# Patient Record
Sex: MALE | Race: WHITE
[De-identification: names, ages, dates, MRNs, and addresses within clinical notes are randomized per-mention and may not be internally consistent; named-entity substitution may affect disease eponyms.]

---

## 2019-01-19 ENCOUNTER — HOSPITAL ENCOUNTER (EMERGENCY)
Dept: HOSPITAL 80 - FED | Age: 19
Discharge: HOME | End: 2019-01-19
Payer: COMMERCIAL

## 2019-01-19 VITALS — SYSTOLIC BLOOD PRESSURE: 103 MMHG | DIASTOLIC BLOOD PRESSURE: 63 MMHG

## 2019-01-19 DIAGNOSIS — F10.920: Primary | ICD-10-CM

## 2019-01-19 NOTE — EDPHY
H & P


Time Seen by Provider: 01/19/19 03:48


HPI/ROS: 





Chief Complaint:  Alcohol intoxication, vomiting





HPI:  17-year-old male who was [facility] in [facility] [employer] [facility] intoxicated.  

Patient passed out after vomiting. Is unable to ambulate on their own.  Patient 

brought in by EMS for further evaluation.  No obvious signs of trauma per EMS.  

Patient admits to drinking 2 shots of vodka and beer.  Denies falling down or 

hitting his head.  He did vomited any ambulance.  Was given Zofran and IV 

fluids.





ROS:  10 systems were reviewed and were negative except those elements noted in 

the HPI.





PMH:  Denies


Medications:  Denies





Social History:  Positive for alcohol





Family History: non-contributory





Physical Exam:


Gen:  Awake, slurred speech, maintaining airway, smells of alcohol and emesis


HEENT:  Atraumatic


     Nose: no epistaxis or deformity


     Eyes: PERRLA, EOMI


     Mouth: Moist mucosa 


Neck: Supple, no step-offs or deformity


Chest:  Atraumatic, lungs clear to auscultation


Heart: S1, S2 normal, no murmur


Abd: Soft, non-tender, no guarding


Back:  Atraumatic


Ext: no edema, atraumatic


Skin: no rash


Neuro:  Sensation grossly intact, Strength 5/5 in bilateral upper and lower 

extremities (Harsh Gilmore)


Constitutional: 





 Initial Vital Signs











Temperature (C)  36.6 C   01/19/19 03:50


 


Heart Rate  75   01/19/19 03:50


 


Respiratory Rate  16   01/19/19 03:50


 


Blood Pressure  104/62   01/19/19 03:50


 


O2 Sat (%)  96   01/19/19 03:50








 











O2 Delivery Mode               Room Air














Allergies/Adverse Reactions: 


 





No Known Allergies Allergy (Unverified 01/19/19 03:49)


 








Home Medications: 














 Medication  Instructions  Recorded


 


NK [No Known Home Meds]  01/19/19














Medical Decision Making


ED Course/Re-evaluation: 





0630  Patient ambulating to the bathroom.  Remains little unsteady on his feet.

  No further vomiting. (Harsh Gilmore)





Patient ambulating without difficulty.  Has a ride home.  The no medical 

complaints. (Jose Mercedes)





Departure





- Departure


Disposition: Home, Routine, Self-Care


Clinical Impression: 


Alcoholic intoxication


Qualifiers:


 Complication of substance-induced condition: uncomplicated Qualified Code(s): 

F10.920 - Alcohol use, unspecified with intoxication, uncomplicated





Condition: Good


Instructions:  Alcohol Intoxication (ED)


Referrals: 


Patient,NotPresent [Primary Care Provider] - As per Instructions

## 2019-01-19 NOTE — ASMTCMCOM
CM Note

 

CM Note                       

Notes:

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  Patient reports that he is aware of the 

[facility] on campus and has been there recently.  Encouraged patient to utilize 

this resource and pay attention to patterns of use that may be resulting in poor choices and 

consequences.

 

Date Signed:  01/19/2019 10:02 AM

Electronically Signed By:Lise Maria RN